# Patient Record
Sex: MALE | Race: OTHER | Employment: OTHER | URBAN - METROPOLITAN AREA
[De-identification: names, ages, dates, MRNs, and addresses within clinical notes are randomized per-mention and may not be internally consistent; named-entity substitution may affect disease eponyms.]

---

## 2018-04-11 NOTE — PATIENT DISCUSSION
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  Refer to [facility] for ""

## 2018-10-23 NOTE — PATIENT DISCUSSION
"""IOP stable on current drop therapy."" ""Continue Timolol GFS both eyes in the morning ."" ""Continue Latanoprost both eyes at bedtime ."""

## 2018-10-23 NOTE — PATIENT DISCUSSION
"""Increase artificial tears. Patient has recently had hip surgery and has been on pain medication.  ""

## 2020-06-24 NOTE — PATIENT DISCUSSION
"""IOP stable on current drop therapy."" ""Continue Levobunolol 0.5% both eyes twice a day ."" ""Continue Latanoprost both eyes at bedtime ."""

## 2020-12-16 NOTE — PATIENT DISCUSSION
"""IOP stable on current drop therapy. Glaucoma testing ordered. "" ""Continue Carteolol 1% both eyes twice a day ."" ""Continue Latanoprost both eyes at bedtime ."""

## 2021-06-17 NOTE — PATIENT DISCUSSION
"""IOP stable on current drop therapy. Glaucoma testing ordered. "" ""Continue Carteolol 1% both eyes twice a day ."" ""Continue Latanoprost both eyes at bedtime .""."

## 2021-12-30 NOTE — PATIENT DISCUSSION
No signs of retinal toxicity. Continue Plaquenil as prescribed by Dr. Radha Peña. Will repeat testing in 1 year.

## 2022-02-22 NOTE — PATIENT DISCUSSION
No signs of retinal toxicity. Continue Plaquenil as prescribed by Dr. Lidya Bustillos. Will repeat testing in 1 year.

## 2022-08-18 NOTE — PATIENT DISCUSSION
Patient previously followed by Dr. Jamal Goff. IOP stable with current drop therapy. Continue carteolol BID OU and Latanoprost QHS OU. Recommend OCT RNFL/HVF at patient's convenience.

## 2022-08-18 NOTE — PATIENT DISCUSSION
No signs of retinal toxicity. Continue Plaquenil as prescribed by Dr. Kulwinder Cuenca.  Will repeat testing in 6 months at comprehensive exam.

## 2022-11-14 ENCOUNTER — NEW PATIENT (OUTPATIENT)
Dept: URBAN - METROPOLITAN AREA CLINIC 20 | Facility: CLINIC | Age: 77
End: 2022-11-14

## 2022-11-14 DIAGNOSIS — Z96.1: ICD-10-CM

## 2022-11-14 DIAGNOSIS — H52.4: ICD-10-CM

## 2022-11-14 DIAGNOSIS — H40.1131: ICD-10-CM

## 2022-11-14 PROCEDURE — 92004 COMPRE OPH EXAM NEW PT 1/>: CPT

## 2022-11-14 PROCEDURE — 92250 FUNDUS PHOTOGRAPHY W/I&R: CPT

## 2022-11-14 ASSESSMENT — TONOMETRY
OS_IOP_MMHG: 22
OD_IOP_MMHG: 17

## 2022-11-14 ASSESSMENT — KERATOMETRY
OS_AXISANGLE_DEGREES: 93
OD_K2POWER_DIOPTERS: 45.25
OD_AXISANGLE2_DEGREES: 11
OD_K1POWER_DIOPTERS: 44.25
OS_AXISANGLE2_DEGREES: 3
OD_AXISANGLE_DEGREES: 101
OS_K2POWER_DIOPTERS: 45.50
OS_K1POWER_DIOPTERS: 43.75

## 2022-11-14 ASSESSMENT — VISUAL ACUITY
OS_SC: 20/40-2
OD_SC: 20/25-2
OU_SC: 20/30

## 2022-12-01 NOTE — PATIENT DISCUSSION
No signs of retinal toxicity. Continue Plaquenil as prescribed by Dr. Lizette Lowery.  Will repeat testing in 6 months at comprehensive exam.

## 2022-12-01 NOTE — PATIENT DISCUSSION
OCT RNFL (12/2022) artifact sup, need repeat OD, thin inferior OS. HVF (12/2022) stable OU. Will call patient with results. Will repeat OCT RNFL at next exam. Continue current management.

## 2022-12-01 NOTE — PATIENT DISCUSSION
Patient previously followed by Dr. Tiffanie Reyes. IOP stable with current drop therapy. Continue carteolol BID OU and Latanoprost QHS OU. Recommend OCT RNFL/HVF at patient's convenience.

## 2023-04-21 ENCOUNTER — FOLLOW UP (OUTPATIENT)
Dept: URBAN - METROPOLITAN AREA CLINIC 20 | Facility: CLINIC | Age: 78
End: 2023-04-21

## 2023-04-21 DIAGNOSIS — H40.89: ICD-10-CM

## 2023-04-21 DIAGNOSIS — H21.552: ICD-10-CM

## 2023-04-21 PROCEDURE — 99213 OFFICE O/P EST LOW 20 MIN: CPT

## 2023-04-21 RX ORDER — BRIMONIDINE TARTRATE 2 MG/MG: 1 SOLUTION/ DROPS OPHTHALMIC TWICE A DAY

## 2023-04-21 RX ORDER — TIMOLOL MALEATE OPHTHALMIC GEL FORMING SOLUTION, 0.25% 2.5 MG/ML: 1 SOLUTION/ DROPS OPHTHALMIC TWICE A DAY

## 2023-04-21 ASSESSMENT — VISUAL ACUITY
OD_SC: 20/20-3
OS_PH: 20/40-3
OS_SC: 20/50-2

## 2023-04-21 ASSESSMENT — TONOMETRY
OS_IOP_MMHG: 12
OS_IOP_MMHG: 11
OD_IOP_MMHG: 11
OD_IOP_MMHG: 12

## 2023-10-20 ENCOUNTER — FOLLOW UP (OUTPATIENT)
Dept: URBAN - METROPOLITAN AREA CLINIC 20 | Facility: CLINIC | Age: 78
End: 2023-10-20

## 2023-10-20 DIAGNOSIS — H40.89: ICD-10-CM

## 2023-10-20 DIAGNOSIS — H21.552: ICD-10-CM

## 2023-10-20 PROCEDURE — 99213 OFFICE O/P EST LOW 20 MIN: CPT

## 2023-10-20 PROCEDURE — 92133 CPTRZD OPH DX IMG PST SGM ON: CPT

## 2023-10-20 ASSESSMENT — TONOMETRY
OS_IOP_MMHG: 18
OD_IOP_MMHG: 13
OD_IOP_MMHG: 10
OS_IOP_MMHG: 16

## 2023-10-20 ASSESSMENT — VISUAL ACUITY
OD_SC: 20/20-2
OS_SC: 20/40-2

## 2024-04-01 ENCOUNTER — FOLLOW UP (OUTPATIENT)
Dept: URBAN - METROPOLITAN AREA CLINIC 20 | Facility: CLINIC | Age: 79
End: 2024-04-01

## 2024-04-01 DIAGNOSIS — H40.1132: ICD-10-CM

## 2024-04-01 PROCEDURE — 99213 OFFICE O/P EST LOW 20 MIN: CPT

## 2024-04-01 ASSESSMENT — VISUAL ACUITY
OD_SC: 20/30-2
OS_SC: 20/60-2

## 2024-04-01 ASSESSMENT — TONOMETRY
OS_IOP_MMHG: 9
OD_IOP_MMHG: 9

## 2024-05-20 ENCOUNTER — FOLLOW UP (OUTPATIENT)
Dept: URBAN - METROPOLITAN AREA CLINIC 20 | Facility: CLINIC | Age: 79
End: 2024-05-20

## 2024-05-20 DIAGNOSIS — H16.142: ICD-10-CM

## 2024-05-20 PROCEDURE — 99212 OFFICE O/P EST SF 10 MIN: CPT

## 2024-05-20 ASSESSMENT — VISUAL ACUITY
OS_SC: 20/200
OU_SC: 20/30-1
OD_SC: 20/30-1

## 2024-05-20 ASSESSMENT — TONOMETRY
OD_IOP_MMHG: 9
OS_IOP_MMHG: 10

## 2024-05-24 ENCOUNTER — FOLLOW UP (OUTPATIENT)
Dept: URBAN - METROPOLITAN AREA CLINIC 20 | Facility: CLINIC | Age: 79
End: 2024-05-24

## 2024-05-24 DIAGNOSIS — H16.142: ICD-10-CM

## 2024-05-24 PROCEDURE — 99213 OFFICE O/P EST LOW 20 MIN: CPT

## 2024-05-24 ASSESSMENT — TONOMETRY
OS_IOP_MMHG: 10
OD_IOP_MMHG: 9

## 2024-05-24 ASSESSMENT — VISUAL ACUITY
OS_SC: 20/30-1
OD_SC: 20/80

## 2024-07-19 ENCOUNTER — FOLLOW UP (OUTPATIENT)
Dept: URBAN - METROPOLITAN AREA CLINIC 20 | Facility: CLINIC | Age: 79
End: 2024-07-19

## 2024-07-19 DIAGNOSIS — H20.9: ICD-10-CM

## 2024-07-19 PROCEDURE — 99213 OFFICE O/P EST LOW 20 MIN: CPT

## 2024-07-19 RX ORDER — PREDNISOLONE ACETATE 10 MG/ML: 1 SUSPENSION/ DROPS OPHTHALMIC

## 2024-07-19 ASSESSMENT — TONOMETRY
OS_IOP_MMHG: 9
OD_IOP_MMHG: 13

## 2024-07-19 ASSESSMENT — VISUAL ACUITY
OS_SC: 20/400
OD_SC: 20/30-2

## 2024-07-22 ENCOUNTER — FOLLOW UP (OUTPATIENT)
Dept: URBAN - METROPOLITAN AREA CLINIC 20 | Facility: CLINIC | Age: 79
End: 2024-07-22

## 2024-07-22 DIAGNOSIS — H20.9: ICD-10-CM

## 2024-07-22 PROCEDURE — 99212 OFFICE O/P EST SF 10 MIN: CPT

## 2024-07-22 ASSESSMENT — VISUAL ACUITY
OD_SC: 20/30+2
OS_SC: 20/200

## 2024-07-22 ASSESSMENT — TONOMETRY
OD_IOP_MMHG: 8
OS_IOP_MMHG: 9

## 2024-07-24 ENCOUNTER — COMPREHENSIVE EXAM (OUTPATIENT)
Dept: URBAN - METROPOLITAN AREA CLINIC 20 | Facility: CLINIC | Age: 79
End: 2024-07-24

## 2024-07-24 DIAGNOSIS — H43.813: ICD-10-CM

## 2024-07-24 DIAGNOSIS — H35.81: ICD-10-CM

## 2024-07-24 DIAGNOSIS — H20.9: ICD-10-CM

## 2024-07-24 PROCEDURE — 92134 CPTRZ OPH DX IMG PST SGM RTA: CPT

## 2024-07-24 PROCEDURE — 92201 OPSCPY EXTND RTA DRAW UNI/BI: CPT

## 2024-07-24 PROCEDURE — 92014 COMPRE OPH EXAM EST PT 1/>: CPT

## 2024-07-24 ASSESSMENT — TONOMETRY
OD_IOP_MMHG: 10
OS_IOP_MMHG: 10

## 2024-07-24 ASSESSMENT — VISUAL ACUITY
OS_SC: 20/200
OD_SC: 20/30

## 2024-07-26 ENCOUNTER — FOLLOW UP (OUTPATIENT)
Dept: URBAN - METROPOLITAN AREA CLINIC 20 | Facility: CLINIC | Age: 79
End: 2024-07-26

## 2024-07-26 DIAGNOSIS — H40.1132: ICD-10-CM

## 2024-07-26 PROCEDURE — 99213 OFFICE O/P EST LOW 20 MIN: CPT

## 2024-07-26 ASSESSMENT — VISUAL ACUITY
OD_SC: 20/50-2
OD_PH: 20/30-2
OU_SC: 20/40-1
OS_SC: CF 6FT

## 2024-07-26 ASSESSMENT — TONOMETRY
OD_IOP_MMHG: 8
OS_IOP_MMHG: 11

## 2024-08-21 ENCOUNTER — FOLLOW UP (OUTPATIENT)
Dept: URBAN - METROPOLITAN AREA CLINIC 20 | Facility: CLINIC | Age: 79
End: 2024-08-21

## 2024-08-21 DIAGNOSIS — H35.81: ICD-10-CM

## 2024-08-21 DIAGNOSIS — H20.9: ICD-10-CM

## 2024-08-21 DIAGNOSIS — H43.813: ICD-10-CM

## 2024-08-21 PROCEDURE — 99214 OFFICE O/P EST MOD 30 MIN: CPT | Mod: 25

## 2024-08-21 PROCEDURE — 67515 INJECT/TREAT EYE SOCKET: CPT

## 2024-08-21 PROCEDURE — 92134 CPTRZ OPH DX IMG PST SGM RTA: CPT

## 2024-08-21 ASSESSMENT — VISUAL ACUITY
OS_SC: 20/100-2
OD_SC: 20/30-1

## 2024-08-21 ASSESSMENT — TONOMETRY
OD_IOP_MMHG: 9
OS_IOP_MMHG: 9

## 2024-09-18 ENCOUNTER — FOLLOW UP (OUTPATIENT)
Dept: URBAN - METROPOLITAN AREA CLINIC 20 | Facility: CLINIC | Age: 79
End: 2024-09-18

## 2024-09-18 DIAGNOSIS — H43.813: ICD-10-CM

## 2024-09-18 DIAGNOSIS — H35.81: ICD-10-CM

## 2024-09-18 DIAGNOSIS — H20.9: ICD-10-CM

## 2024-09-18 PROCEDURE — G2211 COMPLEX E/M VISIT ADD ON: HCPCS

## 2024-09-18 PROCEDURE — 99213 OFFICE O/P EST LOW 20 MIN: CPT

## 2024-09-18 PROCEDURE — 92134 CPTRZ OPH DX IMG PST SGM RTA: CPT

## 2024-10-30 ENCOUNTER — FOLLOW UP (OUTPATIENT)
Dept: URBAN - METROPOLITAN AREA CLINIC 20 | Facility: CLINIC | Age: 79
End: 2024-10-30

## 2024-10-30 DIAGNOSIS — H20.9: ICD-10-CM

## 2024-10-30 DIAGNOSIS — H43.813: ICD-10-CM

## 2024-10-30 DIAGNOSIS — H35.81: ICD-10-CM

## 2024-10-30 PROCEDURE — 92134 CPTRZ OPH DX IMG PST SGM RTA: CPT

## 2024-10-30 PROCEDURE — 99213 OFFICE O/P EST LOW 20 MIN: CPT

## 2025-01-08 ENCOUNTER — FOLLOW UP (OUTPATIENT)
Age: 80
End: 2025-01-08

## 2025-01-08 DIAGNOSIS — H43.813: ICD-10-CM

## 2025-01-08 DIAGNOSIS — H04.123: ICD-10-CM

## 2025-01-08 DIAGNOSIS — H35.373: ICD-10-CM

## 2025-01-08 DIAGNOSIS — H20.9: ICD-10-CM

## 2025-01-08 PROCEDURE — 92134 CPTRZ OPH DX IMG PST SGM RTA: CPT

## 2025-01-08 PROCEDURE — 99213 OFFICE O/P EST LOW 20 MIN: CPT

## 2025-02-13 ENCOUNTER — COMPREHENSIVE EXAM (OUTPATIENT)
Age: 80
End: 2025-02-13

## 2025-02-13 DIAGNOSIS — H10.11: ICD-10-CM

## 2025-02-13 DIAGNOSIS — H16.223: ICD-10-CM

## 2025-02-13 DIAGNOSIS — H40.1132: ICD-10-CM

## 2025-02-13 PROCEDURE — 99214 OFFICE O/P EST MOD 30 MIN: CPT

## 2025-04-07 ENCOUNTER — EMERGENCY VISIT (OUTPATIENT)
Age: 80
End: 2025-04-07

## 2025-04-07 DIAGNOSIS — H16.223: ICD-10-CM

## 2025-04-07 DIAGNOSIS — H20.9: ICD-10-CM

## 2025-04-07 DIAGNOSIS — H40.1132: ICD-10-CM

## 2025-04-07 PROCEDURE — 99213 OFFICE O/P EST LOW 20 MIN: CPT

## 2025-04-21 ENCOUNTER — FOLLOW UP (OUTPATIENT)
Age: 80
End: 2025-04-21

## 2025-04-21 DIAGNOSIS — H40.1132: ICD-10-CM

## 2025-04-21 DIAGNOSIS — H20.9: ICD-10-CM

## 2025-04-21 DIAGNOSIS — H35.81: ICD-10-CM

## 2025-04-21 DIAGNOSIS — H16.223: ICD-10-CM

## 2025-04-21 PROCEDURE — 99214 OFFICE O/P EST MOD 30 MIN: CPT

## 2025-06-25 ENCOUNTER — CONSULTATION/EVALUATION (OUTPATIENT)
Age: 80
End: 2025-06-25

## 2025-07-03 ENCOUNTER — FOLLOW UP (OUTPATIENT)
Age: 80
End: 2025-07-03

## 2025-07-03 DIAGNOSIS — H20.9: ICD-10-CM

## 2025-07-03 PROCEDURE — 99214 OFFICE O/P EST MOD 30 MIN: CPT

## 2025-07-03 RX ORDER — TIMOLOL MALEATE 5 MG/ML: 1 SOLUTION OPHTHALMIC TWICE A DAY

## 2025-07-10 ENCOUNTER — CLINIC PROCEDURE ONLY (OUTPATIENT)
Age: 80
End: 2025-07-10

## 2025-07-10 DIAGNOSIS — H40.1132: ICD-10-CM

## 2025-07-10 PROCEDURE — 99214 OFFICE O/P EST MOD 30 MIN: CPT

## 2025-07-10 PROCEDURE — 65855 TRABECULOPLASTY LASER SURG: CPT | Mod: 57,LT

## 2025-07-17 ENCOUNTER — POST-OP (OUTPATIENT)
Age: 80
End: 2025-07-17

## 2025-07-17 DIAGNOSIS — Z98.890: ICD-10-CM

## 2025-07-17 PROCEDURE — 66999PO NON CO-MANAGED OTHER SURGERY PO

## 2025-08-15 ENCOUNTER — TECH ONLY (OUTPATIENT)
Age: 80
End: 2025-08-15

## 2025-08-15 DIAGNOSIS — H20.9: ICD-10-CM

## 2025-08-15 PROCEDURE — 99214 OFFICE O/P EST MOD 30 MIN: CPT

## 2025-08-20 ENCOUNTER — FOLLOW UP (OUTPATIENT)
Age: 80
End: 2025-08-20

## 2025-08-20 DIAGNOSIS — H20.9: ICD-10-CM

## 2025-08-20 PROCEDURE — 99213 OFFICE O/P EST LOW 20 MIN: CPT

## 2025-08-25 ENCOUNTER — FOLLOW UP (OUTPATIENT)
Age: 80
End: 2025-08-25

## 2025-08-25 DIAGNOSIS — H40.1132: ICD-10-CM

## 2025-08-25 PROCEDURE — 99213 OFFICE O/P EST LOW 20 MIN: CPT

## 2025-08-28 ENCOUNTER — FOLLOW UP (OUTPATIENT)
Age: 80
End: 2025-08-28

## 2025-08-28 DIAGNOSIS — H20.00: ICD-10-CM

## 2025-08-28 PROCEDURE — 99213 OFFICE O/P EST LOW 20 MIN: CPT
